# Patient Record
Sex: FEMALE | Race: WHITE | URBAN - NONMETROPOLITAN AREA
[De-identification: names, ages, dates, MRNs, and addresses within clinical notes are randomized per-mention and may not be internally consistent; named-entity substitution may affect disease eponyms.]

---

## 2023-01-06 ENCOUNTER — OFFICE VISIT (OUTPATIENT)
Dept: URBAN - NONMETROPOLITAN AREA CLINIC 4 | Facility: CLINIC | Age: 43
End: 2023-01-06
Payer: COMMERCIAL

## 2023-01-06 ENCOUNTER — LAB OUTSIDE AN ENCOUNTER (OUTPATIENT)
Dept: URBAN - NONMETROPOLITAN AREA CLINIC 4 | Facility: CLINIC | Age: 43
End: 2023-01-06

## 2023-01-06 VITALS
HEIGHT: 64 IN | SYSTOLIC BLOOD PRESSURE: 115 MMHG | HEART RATE: 101 BPM | WEIGHT: 261.8 LBS | TEMPERATURE: 97.5 F | BODY MASS INDEX: 44.69 KG/M2 | DIASTOLIC BLOOD PRESSURE: 69 MMHG

## 2023-01-06 DIAGNOSIS — K58.2 IRRITABLE BOWEL SYNDROME WITH BOTH CONSTIPATION AND DIARRHEA: ICD-10-CM

## 2023-01-06 DIAGNOSIS — K21.9 GASTROESOPHAGEAL REFLUX DISEASE, UNSPECIFIED WHETHER ESOPHAGITIS PRESENT: ICD-10-CM

## 2023-01-06 DIAGNOSIS — R05.9 COUGH, UNSPECIFIED TYPE: ICD-10-CM

## 2023-01-06 DIAGNOSIS — Z87.11 HISTORY OF STOMACH ULCERS: ICD-10-CM

## 2023-01-06 DIAGNOSIS — Z86.010 HISTORY OF COLON POLYPS: ICD-10-CM

## 2023-01-06 DIAGNOSIS — R13.10 DYSPHAGIA, UNSPECIFIED TYPE: ICD-10-CM

## 2023-01-06 PROBLEM — 10743008: Status: ACTIVE | Noted: 2023-01-06

## 2023-01-06 PROCEDURE — 99204 OFFICE O/P NEW MOD 45 MIN: CPT | Performed by: REGISTERED NURSE

## 2023-01-06 NOTE — HPI-TODAY'S VISIT:
1/6/23: Pt is a 43 yo female with PMH of GERD, IBS-M, PUD, anemia, anxiety, depression, colon polyps, PE, TIA who was referred by [facility] for evaluation of GERD and hernia.  Pt reports GERD with associated cough that started a year ago. She was prescribed omeprazole initially. About 1.5 month ago, had severe burning in throat and aspirated. She went to ED and sent home on Pantoprazole and Carafate. Since then, throat burns and she has noticed hoarse voice. She reports occasional dysphagia to solids. Reports globus sensation at times. She had her GB removed in 2012 and reports chronic intermittent nausea. Her last EGD was in 2012. Prior to that she was found to have stomach ulcers. She has a Hx of IBS-M. Denies any hematochezia or melena. Last cscope 2019 that revealed polyps.

## 2023-01-10 PROBLEM — 40739000: Status: ACTIVE | Noted: 2023-01-06

## 2023-01-19 ENCOUNTER — OFFICE VISIT (OUTPATIENT)
Dept: URBAN - METROPOLITAN AREA SURGERY CENTER 14 | Facility: SURGERY CENTER | Age: 43
End: 2023-01-19

## 2023-01-19 ENCOUNTER — CLAIMS CREATED FROM THE CLAIM WINDOW (OUTPATIENT)
Dept: URBAN - METROPOLITAN AREA CLINIC 4 | Facility: CLINIC | Age: 43
End: 2023-01-19
Payer: COMMERCIAL

## 2023-01-19 ENCOUNTER — CLAIMS CREATED FROM THE CLAIM WINDOW (OUTPATIENT)
Dept: URBAN - METROPOLITAN AREA SURGERY CENTER 14 | Facility: SURGERY CENTER | Age: 43
End: 2023-01-19
Payer: COMMERCIAL

## 2023-01-19 DIAGNOSIS — K31.89 OTHER DISEASES OF STOMACH AND DUODENUM: ICD-10-CM

## 2023-01-19 DIAGNOSIS — K31.89 ACQUIRED DEFORMITY OF DUODENUM: ICD-10-CM

## 2023-01-19 DIAGNOSIS — R13.19 CERVICAL DYSPHAGIA: ICD-10-CM

## 2023-01-19 DIAGNOSIS — R10.13 ABDOMINAL DISCOMFORT, EPIGASTRIC: ICD-10-CM

## 2023-01-19 PROCEDURE — 88312 SPECIAL STAINS GROUP 1: CPT | Performed by: PATHOLOGY

## 2023-01-19 PROCEDURE — 88305 TISSUE EXAM BY PATHOLOGIST: CPT | Performed by: PATHOLOGY

## 2023-01-19 PROCEDURE — G8907 PT DOC NO EVENTS ON DISCHARG: HCPCS | Performed by: INTERNAL MEDICINE

## 2023-01-19 PROCEDURE — 43248 EGD GUIDE WIRE INSERTION: CPT | Performed by: INTERNAL MEDICINE

## 2023-01-19 PROCEDURE — 43239 EGD BIOPSY SINGLE/MULTIPLE: CPT | Performed by: INTERNAL MEDICINE

## 2023-02-21 ENCOUNTER — OFFICE VISIT (OUTPATIENT)
Dept: URBAN - NONMETROPOLITAN AREA CLINIC 4 | Facility: CLINIC | Age: 43
End: 2023-02-21

## 2023-03-30 ENCOUNTER — OFFICE VISIT (OUTPATIENT)
Dept: URBAN - NONMETROPOLITAN AREA CLINIC 4 | Facility: CLINIC | Age: 43
End: 2023-03-30

## 2023-05-05 ENCOUNTER — DASHBOARD ENCOUNTERS (OUTPATIENT)
Age: 43
End: 2023-05-05

## 2023-05-05 ENCOUNTER — OFFICE VISIT (OUTPATIENT)
Dept: URBAN - NONMETROPOLITAN AREA CLINIC 4 | Facility: CLINIC | Age: 43
End: 2023-05-05
Payer: COMMERCIAL

## 2023-05-05 ENCOUNTER — WEB ENCOUNTER (OUTPATIENT)
Dept: URBAN - NONMETROPOLITAN AREA CLINIC 4 | Facility: CLINIC | Age: 43
End: 2023-05-05

## 2023-05-05 VITALS
SYSTOLIC BLOOD PRESSURE: 117 MMHG | TEMPERATURE: 97.5 F | HEIGHT: 64 IN | BODY MASS INDEX: 44.46 KG/M2 | WEIGHT: 260.4 LBS | HEART RATE: 97 BPM | DIASTOLIC BLOOD PRESSURE: 71 MMHG

## 2023-05-05 DIAGNOSIS — Z86.010 HISTORY OF COLON POLYPS: ICD-10-CM

## 2023-05-05 DIAGNOSIS — K21.9 GASTROESOPHAGEAL REFLUX DISEASE, UNSPECIFIED WHETHER ESOPHAGITIS PRESENT: ICD-10-CM

## 2023-05-05 DIAGNOSIS — K58.0 IRRITABLE BOWEL SYNDROME WITH DIARRHEA: ICD-10-CM

## 2023-05-05 PROBLEM — 428283002: Status: ACTIVE | Noted: 2023-01-06

## 2023-05-05 PROBLEM — 235595009: Status: ACTIVE | Noted: 2023-01-06

## 2023-05-05 PROBLEM — 197125005: Status: ACTIVE | Noted: 2023-05-05

## 2023-05-05 PROCEDURE — 99214 OFFICE O/P EST MOD 30 MIN: CPT | Performed by: REGISTERED NURSE

## 2023-05-05 NOTE — HPI-TODAY'S VISIT:
1/6/23: Pt is a 43 yo female with PMH of GERD, IBS-M, PUD, anemia, anxiety, depression, colon polyps, PE, TIA who was referred by [facility] for evaluation of GERD and hernia.  Pt reports GERD with associated cough that started a year ago. She was prescribed omeprazole initially. About 1.5 month ago, had severe burning in throat and aspirated. She went to ED and sent home on Pantoprazole and Carafate. Since then, throat burns and she has noticed hoarse voice. She reports occasional dysphagia to solids. Reports globus sensation at times. She had her GB removed in 2012 and reports chronic intermittent nausea. Her last EGD was in 2012. Prior to that she was found to have stomach ulcers. She has a Hx of IBS-M. Denies any hematochezia or melena. Last cscope 2019 that revealed polyps.  5/5/23: Pt RTC for f/u. Had EGD 1/19/23: - The examined portions of the nasopharynx, oropharynx and larynx were normal. - No endoscopic esophageal abnormality to explain patient's dysphagia. Esophagus dilated. - Small hiatal hernia. - Erythematous mucosa in the antrum. Biopsied. - Normal examined duodenum. Bx benign  States her reflux and cough have improved. She is currently taking Protonix 40 mg daily. Denies any further dysphagia. Continues to c/o diarrhea, abdominal pain, bloating and gas. Did not do FODMAP diet completely. She has tried Imodium, but it makes her sick. Fiber supplements causes more pain, nausea, bloating and constipation.

## 2023-08-11 ENCOUNTER — OFFICE VISIT (OUTPATIENT)
Dept: URBAN - NONMETROPOLITAN AREA CLINIC 4 | Facility: CLINIC | Age: 43
End: 2023-08-11

## 2024-07-02 ENCOUNTER — LAB OUTSIDE AN ENCOUNTER (OUTPATIENT)
Dept: URBAN - NONMETROPOLITAN AREA CLINIC 4 | Facility: CLINIC | Age: 44
End: 2024-07-02

## 2024-07-02 ENCOUNTER — OFFICE VISIT (OUTPATIENT)
Dept: URBAN - NONMETROPOLITAN AREA CLINIC 4 | Facility: CLINIC | Age: 44
End: 2024-07-02
Payer: COMMERCIAL

## 2024-07-02 VITALS
TEMPERATURE: 97.9 F | WEIGHT: 264.6 LBS | HEART RATE: 95 BPM | DIASTOLIC BLOOD PRESSURE: 82 MMHG | SYSTOLIC BLOOD PRESSURE: 121 MMHG | HEIGHT: 64 IN | BODY MASS INDEX: 45.17 KG/M2

## 2024-07-02 DIAGNOSIS — R05.8 OTHER COUGH: ICD-10-CM

## 2024-07-02 DIAGNOSIS — K21.9 GASTROESOPHAGEAL REFLUX DISEASE, UNSPECIFIED WHETHER ESOPHAGITIS PRESENT: ICD-10-CM

## 2024-07-02 DIAGNOSIS — R13.19 DYSPHAGIA: ICD-10-CM

## 2024-07-02 DIAGNOSIS — K58.0 IRRITABLE BOWEL SYNDROME WITH DIARRHEA: ICD-10-CM

## 2024-07-02 PROCEDURE — 99214 OFFICE O/P EST MOD 30 MIN: CPT | Performed by: REGISTERED NURSE

## 2024-07-02 RX ORDER — CHOLESTYRAMINE 4 G/9G
1 PACKET MIXED WITH WATER OR NON-CARBONATED DRINK POWDER, FOR SUSPENSION ORAL ONCE A DAY
Qty: 30 | Refills: 1 | OUTPATIENT
Start: 2024-07-02

## 2024-07-02 RX ORDER — ROPINIROLE HYDROCHLORIDE 1 MG/1
1 TABLET 1 TO 3 HOURS BEFORE BEDTIME TABLET, FILM COATED ORAL ONCE A DAY
Status: ACTIVE | COMMUNITY

## 2024-07-02 NOTE — HPI-TODAY'S VISIT:
1/6/23: Pt is a 43 yo female with PMH of GERD, IBS-M, PUD, anemia, anxiety, depression, colon polyps, PE, TIA who was referred by [facility] for evaluation of GERD and hernia.  Pt reports GERD with associated cough that started a year ago. She was prescribed omeprazole initially. About 1.5 month ago, had severe burning in throat and aspirated. She went to ED and sent home on Pantoprazole and Carafate. Since then, throat burns and she has noticed hoarse voice. She reports occasional dysphagia to solids. Reports globus sensation at times. She had her GB removed in 2012 and reports chronic intermittent nausea. Her last EGD was in 2012. Prior to that she was found to have stomach ulcers. She has a Hx of IBS-M. Denies any hematochezia or melena. Last cscope 2019 that revealed polyps.  5/5/23: Pt RTC for f/u. Had EGD 1/19/23: - The examined portions of the nasopharynx, oropharynx and larynx were normal. - No endoscopic esophageal abnormality to explain patient's dysphagia. Esophagus dilated. - Small hiatal hernia. - Erythematous mucosa in the antrum. Biopsied. - Normal examined duodenum. Bx benign  States her reflux and cough have improved. She is currently taking Protonix 40 mg daily. Denies any further dysphagia. Continues to c/o diarrhea, abdominal pain, bloating and gas. Did not do FODMAP diet completely. She has tried Imodium, but it makes her sick. Fiber supplements causes more pain, nausea, bloating and constipation.  7/2/24: Pt RTC with c/o cough, hoarseness, globus sensation, epigastric pain and occasional dysphagia for past 3-4 months. Seen by ENT, pulm and cards. Had nasolaryngoscopy and was told she was "full of acid." She is taking pantoprazole in morning and started on omeprazole in evening 1 mo ago and famotidine 20 mg BID 2 weeks ago. She also reports yellow diarrhea. Has Hx of IBS. Has had GB removed.

## 2024-08-29 ENCOUNTER — TELEPHONE ENCOUNTER (OUTPATIENT)
Dept: URBAN - NONMETROPOLITAN AREA CLINIC 4 | Facility: CLINIC | Age: 44
End: 2024-08-29

## 2024-08-29 RX ORDER — CHOLESTYRAMINE 4 G/9G
1 PACKET MIXED WITH WATER OR NON-CARBONATED DRINK POWDER, FOR SUSPENSION ORAL ONCE A DAY
Qty: 30 | Refills: 1
Start: 2024-07-02

## 2024-09-20 ENCOUNTER — TELEPHONE ENCOUNTER (OUTPATIENT)
Dept: URBAN - NONMETROPOLITAN AREA CLINIC 4 | Facility: CLINIC | Age: 44
End: 2024-09-20

## 2024-09-20 RX ORDER — DICYCLOMINE HYDROCHLORIDE 20 MG/1
1 TABLET TABLET ORAL THREE TIMES A DAY
Qty: 90 | Refills: 0 | OUTPATIENT
Start: 2024-09-23 | End: 2024-10-23

## 2024-09-25 ENCOUNTER — OFFICE VISIT (OUTPATIENT)
Dept: URBAN - METROPOLITAN AREA MEDICAL CENTER 24 | Facility: MEDICAL CENTER | Age: 44
End: 2024-09-25

## 2024-09-25 RX ORDER — CHOLESTYRAMINE 4 G/9G
1 PACKET MIXED WITH WATER OR NON-CARBONATED DRINK POWDER, FOR SUSPENSION ORAL ONCE A DAY
Qty: 30 | Refills: 1 | Status: ACTIVE | COMMUNITY
Start: 2024-07-02

## 2024-09-25 RX ORDER — DICYCLOMINE HYDROCHLORIDE 20 MG/1
1 TABLET TABLET ORAL THREE TIMES A DAY
Qty: 90 | Refills: 0 | Status: ACTIVE | COMMUNITY
Start: 2024-09-23 | End: 2024-10-23

## 2024-09-25 RX ORDER — ROPINIROLE HYDROCHLORIDE 1 MG/1
1 TABLET 1 TO 3 HOURS BEFORE BEDTIME TABLET, FILM COATED ORAL ONCE A DAY
Status: ACTIVE | COMMUNITY

## 2024-10-07 ENCOUNTER — TELEPHONE ENCOUNTER (OUTPATIENT)
Dept: URBAN - METROPOLITAN AREA CLINIC 54 | Facility: CLINIC | Age: 44
End: 2024-10-07

## 2024-10-11 ENCOUNTER — OFFICE VISIT (OUTPATIENT)
Dept: URBAN - METROPOLITAN AREA MEDICAL CENTER 24 | Facility: MEDICAL CENTER | Age: 44
End: 2024-10-11

## 2024-10-22 ENCOUNTER — TELEPHONE ENCOUNTER (OUTPATIENT)
Dept: URBAN - NONMETROPOLITAN AREA CLINIC 4 | Facility: CLINIC | Age: 44
End: 2024-10-22

## 2024-10-22 RX ORDER — DICYCLOMINE HYDROCHLORIDE 20 MG/1
1 TABLET TABLET ORAL THREE TIMES A DAY
Qty: 90 | Refills: 0
Start: 2024-09-23 | End: 2024-11-21

## 2024-10-28 ENCOUNTER — OFFICE VISIT (OUTPATIENT)
Dept: URBAN - NONMETROPOLITAN AREA CLINIC 4 | Facility: CLINIC | Age: 44
End: 2024-10-28
Payer: COMMERCIAL

## 2024-10-28 VITALS
HEART RATE: 97 BPM | DIASTOLIC BLOOD PRESSURE: 68 MMHG | SYSTOLIC BLOOD PRESSURE: 127 MMHG | BODY MASS INDEX: 45.07 KG/M2 | WEIGHT: 264 LBS | HEIGHT: 64 IN

## 2024-10-28 DIAGNOSIS — R12 FUNCTIONAL HEARTBURN: ICD-10-CM

## 2024-10-28 DIAGNOSIS — Z86.0100 PERSONAL HISTORY OF COLON POLYPS, UNSPECIFIED: ICD-10-CM

## 2024-10-28 DIAGNOSIS — K58.0 IRRITABLE BOWEL SYNDROME WITH DIARRHEA: ICD-10-CM

## 2024-10-28 PROCEDURE — 99213 OFFICE O/P EST LOW 20 MIN: CPT | Performed by: REGISTERED NURSE

## 2024-10-28 RX ORDER — ROPINIROLE HYDROCHLORIDE 1 MG/1
1 TABLET 1 TO 3 HOURS BEFORE BEDTIME TABLET, FILM COATED ORAL ONCE A DAY
Status: ACTIVE | COMMUNITY

## 2024-10-28 RX ORDER — DICYCLOMINE HYDROCHLORIDE 20 MG/1
1 TABLET TABLET ORAL THREE TIMES A DAY
Qty: 90 | Refills: 0 | Status: ACTIVE | COMMUNITY
Start: 2024-09-23 | End: 2024-11-21

## 2024-10-28 RX ORDER — CHOLESTYRAMINE 4 G/9G
1 PACKET MIXED WITH WATER OR NON-CARBONATED DRINK POWDER, FOR SUSPENSION ORAL ONCE A DAY
Qty: 30 | Refills: 1 | Status: ACTIVE | COMMUNITY
Start: 2024-07-02

## 2024-10-28 NOTE — HPI-TODAY'S VISIT:
1/6/23: Pt is a 43 yo female with PMH of GERD, IBS-M, PUD, anemia, anxiety, depression, colon polyps, PE, TIA who was referred by [facility] for evaluation of GERD and hernia.  Pt reports GERD with associated cough that started a year ago. She was prescribed omeprazole initially. About 1.5 month ago, had severe burning in throat and aspirated. She went to ED and sent home on Pantoprazole and Carafate. Since then, throat burns and she has noticed hoarse voice. She reports occasional dysphagia to solids. Reports globus sensation at times. She had her GB removed in 2012 and reports chronic intermittent nausea. Her last EGD was in 2012. Prior to that she was found to have stomach ulcers. She has a Hx of IBS-M. Denies any hematochezia or melena. Last cscope 2019 that revealed polyps.  5/5/23: Pt RTC for f/u. Had EGD 1/19/23: - The examined portions of the nasopharynx, oropharynx and larynx were normal. - No endoscopic esophageal abnormality to explain patient's dysphagia. Esophagus dilated. - Small hiatal hernia. - Erythematous mucosa in the antrum. Biopsied. - Normal examined duodenum. Bx benign  States her reflux and cough have improved. She is currently taking Protonix 40 mg daily. Denies any further dysphagia. Continues to c/o diarrhea, abdominal pain, bloating and gas. Did not do FODMAP diet completely. She has tried Imodium, but it makes her sick. Fiber supplements causes more pain, nausea, bloating and constipation.  7/2/24: Pt RTC with c/o cough, hoarseness, globus sensation, epigastric pain and occasional dysphagia for past 3-4 months. Seen by ENT, pulm and cards. Had nasolaryngoscopy and was told she was "full of acid." She is taking pantoprazole in morning and started on omeprazole in evening 1 mo ago and famotidine 20 mg BID 2 weeks ago. She also reports yellow diarrhea. Has Hx of IBS. Has had GB removed.  10/28/24: Pt RTC for f/u. Had EGD w/ Bravo 9/25/24: - Normal esophagus. - Z-line regular, 40 cm from the incisors. - Erythematous mucosa in the stomach. Biopsied. - Normal examined duodenum. Biopsied. - The BRAVO pH capsule was positioned 29 cm from the incisors, which was 6 cm proximal to the GE junction. Bx c/w benign inflammation Bravo study c/w functional heartburn  She continues to c/o cough, hoarseness, globus sensation and bilious . Remains on acid suppressive medications. States she notices her symptoms are worse when she is stressed. She is taking Questran and Bentyl, which helps her diarrhea and abdominal pain. Thinks her last cscope was in 2020 that revealed polyps.

## 2024-11-07 ENCOUNTER — TELEPHONE ENCOUNTER (OUTPATIENT)
Dept: URBAN - NONMETROPOLITAN AREA CLINIC 4 | Facility: CLINIC | Age: 44
End: 2024-11-07

## 2024-11-07 RX ORDER — CHOLESTYRAMINE 4 G/9G
1 PACKET MIXED WITH WATER OR NON-CARBONATED DRINK POWDER, FOR SUSPENSION ORAL ONCE A DAY
Qty: 30 | Refills: 1
Start: 2024-07-02

## 2024-12-06 ENCOUNTER — TELEPHONE ENCOUNTER (OUTPATIENT)
Dept: URBAN - NONMETROPOLITAN AREA CLINIC 4 | Facility: CLINIC | Age: 44
End: 2024-12-06

## 2024-12-06 RX ORDER — DICYCLOMINE HYDROCHLORIDE 20 MG/1
1 TABLET TABLET ORAL THREE TIMES A DAY
Qty: 90 | Refills: 0
Start: 2024-09-23 | End: 2025-01-05

## 2025-01-06 ENCOUNTER — TELEPHONE ENCOUNTER (OUTPATIENT)
Dept: URBAN - NONMETROPOLITAN AREA CLINIC 4 | Facility: CLINIC | Age: 45
End: 2025-01-06

## 2025-01-06 RX ORDER — DICYCLOMINE HYDROCHLORIDE 20 MG/1
1 TABLET TABLET ORAL THREE TIMES A DAY
Qty: 90 | Refills: 0
Start: 2024-09-23 | End: 2025-02-05

## 2025-02-13 ENCOUNTER — TELEPHONE ENCOUNTER (OUTPATIENT)
Dept: URBAN - NONMETROPOLITAN AREA CLINIC 4 | Facility: CLINIC | Age: 45
End: 2025-02-13

## 2025-02-13 RX ORDER — DICYCLOMINE HYDROCHLORIDE 20 MG/1
1 TABLET TABLET ORAL THREE TIMES A DAY
Qty: 90 | Refills: 0
Start: 2024-09-23 | End: 2025-03-15

## 2025-03-24 ENCOUNTER — TELEPHONE ENCOUNTER (OUTPATIENT)
Dept: URBAN - NONMETROPOLITAN AREA CLINIC 4 | Facility: CLINIC | Age: 45
End: 2025-03-24

## 2025-03-24 RX ORDER — DICYCLOMINE HYDROCHLORIDE 20 MG/1
1 TABLET TABLET ORAL THREE TIMES A DAY
Qty: 90 | Refills: 3
Start: 2024-09-23

## 2025-03-26 ENCOUNTER — OFFICE VISIT (OUTPATIENT)
Dept: URBAN - NONMETROPOLITAN AREA CLINIC 4 | Facility: CLINIC | Age: 45
End: 2025-03-26
Payer: COMMERCIAL

## 2025-03-26 ENCOUNTER — LAB OUTSIDE AN ENCOUNTER (OUTPATIENT)
Dept: URBAN - NONMETROPOLITAN AREA CLINIC 4 | Facility: CLINIC | Age: 45
End: 2025-03-26

## 2025-03-26 DIAGNOSIS — R12 FUNCTIONAL HEARTBURN: ICD-10-CM

## 2025-03-26 DIAGNOSIS — K58.0 IRRITABLE BOWEL SYNDROME WITH DIARRHEA: ICD-10-CM

## 2025-03-26 DIAGNOSIS — K44.9 HIATAL HERNIA: ICD-10-CM

## 2025-03-26 DIAGNOSIS — E66.813 OBESITY, CLASS 3: ICD-10-CM

## 2025-03-26 DIAGNOSIS — K76.0 NONALCOHOLIC FATTY LIVER: ICD-10-CM

## 2025-03-26 DIAGNOSIS — E66.01 MORBID (SEVERE) OBESITY DUE TO EXCESS CALORIES: ICD-10-CM

## 2025-03-26 DIAGNOSIS — Z86.0100 PERSONAL HISTORY OF COLON POLYPS, UNSPECIFIED: ICD-10-CM

## 2025-03-26 DIAGNOSIS — R79.89 ELEVATED LFTS: ICD-10-CM

## 2025-03-26 PROBLEM — 408512008: Status: ACTIVE | Noted: 2025-03-26

## 2025-03-26 PROBLEM — 722866000: Status: ACTIVE | Noted: 2025-03-26

## 2025-03-26 PROBLEM — 722596001: Status: ACTIVE | Noted: 2025-03-26

## 2025-03-26 PROCEDURE — 99214 OFFICE O/P EST MOD 30 MIN: CPT | Performed by: REGISTERED NURSE

## 2025-03-26 RX ORDER — DICYCLOMINE HYDROCHLORIDE 20 MG/1
1 TABLET TABLET ORAL THREE TIMES A DAY
Qty: 90 | Refills: 0 | Status: ACTIVE | COMMUNITY
Start: 2024-09-23

## 2025-03-26 RX ORDER — ROPINIROLE HYDROCHLORIDE 1 MG/1
1 TABLET 1 TO 3 HOURS BEFORE BEDTIME TABLET, FILM COATED ORAL ONCE A DAY
Status: ACTIVE | COMMUNITY

## 2025-03-26 RX ORDER — CHOLESTYRAMINE 4 G/9G
1 PACKET MIXED WITH WATER OR NON-CARBONATED DRINK POWDER, FOR SUSPENSION ORAL ONCE A DAY
Qty: 30 | Refills: 1 | Status: ACTIVE | COMMUNITY
Start: 2024-07-02

## 2025-03-26 NOTE — HPI-TODAY'S VISIT:
1/6/23: Pt is a 43 yo female with PMH of GERD, IBS-M, PUD, anemia, anxiety, depression, colon polyps, PE, TIA who was referred by [facility] for evaluation of GERD and hernia.  Pt reports GERD with associated cough that started a year ago. She was prescribed omeprazole initially. About 1.5 month ago, had severe burning in throat and aspirated. She went to ED and sent home on Pantoprazole and Carafate. Since then, throat burns and she has noticed hoarse voice. She reports occasional dysphagia to solids. Reports globus sensation at times. She had her GB removed in 2012 and reports chronic intermittent nausea. Her last EGD was in 2012. Prior to that she was found to have stomach ulcers. She has a Hx of IBS-M. Denies any hematochezia or melena. Last cscope 2019 that revealed polyps.  5/5/23: Pt RTC for f/u. Had EGD 1/19/23: - The examined portions of the nasopharynx, oropharynx and larynx were normal. - No endoscopic esophageal abnormality to explain patient's dysphagia. Esophagus dilated. - Small hiatal hernia. - Erythematous mucosa in the antrum. Biopsied. - Normal examined duodenum. Bx benign  States her reflux and cough have improved. She is currently taking Protonix 40 mg daily. Denies any further dysphagia. Continues to c/o diarrhea, abdominal pain, bloating and gas. Did not do FODMAP diet completely. She has tried Imodium, but it makes her sick. Fiber supplements causes more pain, nausea, bloating and constipation.  7/2/24: Pt RTC with c/o cough, hoarseness, globus sensation, epigastric pain and occasional dysphagia for past 3-4 months. Seen by ENT, pulm and cards. Had nasolaryngoscopy and was told she was "full of acid." She is taking pantoprazole in morning and started on omeprazole in evening 1 mo ago and famotidine 20 mg BID 2 weeks ago. She also reports yellow diarrhea. Has Hx of IBS. Has had GB removed.  10/28/24: Pt RTC for f/u. Had EGD w/ Bravo 9/25/24: - Normal esophagus. - Z-line regular, 40 cm from the incisors. - Erythematous mucosa in the stomach. Biopsied. - Normal examined duodenum. Biopsied. - The BRAVO pH capsule was positioned 29 cm from the incisors, which was 6 cm proximal to the GE junction. Bx c/w benign inflammation Bravo study c/w functional heartburn  She continues to c/o cough, hoarseness, globus sensation and bilious . Remains on acid suppressive medications. States she notices her symptoms are worse when she is stressed. She is taking Questran and Bentyl, which helps her diarrhea and abdominal pain. Thinks her last cscope was in 2020 that revealed polyps.  3/26/25: Pt was referred by Dr. Valdo Jennings for evaluation of elevated LFTs. She has a history of fatty liver. She had labs drawn by PCP that showed elevated LFTs. She was recently admitted to [facility] from 3/18-3/19 for stroke-like symptoms and found with elevated LFTs (AST 70 and ALT 76). CT and MRI head revealed sphenoid sinusitis. She followed up with PCP on 3/24 and was prescribed a 10 day course Doxycycline. She was also prescribed tirzepatide for weight loss.

## 2025-03-27 LAB
ALBUMIN/GLOBULIN RATIO: 1.4
ALBUMIN: 4.4
ALKALINE PHOSPHATASE: 76
ALT: 88
AST: 80
BILIRUBIN, DIRECT: 0.1
BILIRUBIN, INDIRECT: 0.4
BILIRUBIN, TOTAL: 0.5
FIB 4 INDEX: 1.09
FIB 4 INTERPRETATION: (no result)
GLOBULIN: 3.1
PLATELET COUNT: 345
PROTEIN, TOTAL: 7.5

## 2025-05-15 NOTE — PHYSICAL EXAM SKIN:
no rashes , no suspicious lesions , no areas of discoloration , no jaundice present , good turgor , no masses , no tenderness on palpation
ED

## 2025-06-02 ENCOUNTER — OFFICE VISIT (OUTPATIENT)
Dept: URBAN - NONMETROPOLITAN AREA CLINIC 4 | Facility: CLINIC | Age: 45
End: 2025-06-02